# Patient Record
Sex: FEMALE | Race: BLACK OR AFRICAN AMERICAN | NOT HISPANIC OR LATINO | URBAN - METROPOLITAN AREA
[De-identification: names, ages, dates, MRNs, and addresses within clinical notes are randomized per-mention and may not be internally consistent; named-entity substitution may affect disease eponyms.]

---

## 2021-10-28 ENCOUNTER — OUTPATIENT (OUTPATIENT)
Dept: OUTPATIENT SERVICES | Facility: HOSPITAL | Age: 56
LOS: 1 days | End: 2021-10-28

## 2021-10-28 DIAGNOSIS — Z00.5 ENCOUNTER FOR EXAMINATION OF POTENTIAL DONOR OF ORGAN AND TISSUE: ICD-10-CM

## 2021-11-24 PROBLEM — Z00.00 ENCOUNTER FOR PREVENTIVE HEALTH EXAMINATION: Status: ACTIVE | Noted: 2021-11-24

## 2021-11-26 ENCOUNTER — OUTPATIENT (OUTPATIENT)
Dept: OUTPATIENT SERVICES | Facility: HOSPITAL | Age: 56
LOS: 1 days | Discharge: HOME | End: 2021-11-26

## 2021-11-26 DIAGNOSIS — Z00.5 ENCOUNTER FOR EXAMINATION OF POTENTIAL DONOR OF ORGAN AND TISSUE: ICD-10-CM

## 2021-11-29 ENCOUNTER — LABORATORY RESULT (OUTPATIENT)
Age: 56
End: 2021-11-29

## 2021-11-29 ENCOUNTER — OUTPATIENT (OUTPATIENT)
Dept: OUTPATIENT SERVICES | Facility: HOSPITAL | Age: 56
LOS: 1 days | End: 2021-11-29

## 2021-11-29 ENCOUNTER — APPOINTMENT (OUTPATIENT)
Dept: HEMATOLOGY ONCOLOGY | Facility: CLINIC | Age: 56
End: 2021-11-29
Payer: COMMERCIAL

## 2021-11-29 ENCOUNTER — RESULT REVIEW (OUTPATIENT)
Age: 56
End: 2021-11-29

## 2021-11-29 VITALS
HEART RATE: 89 BPM | BODY MASS INDEX: 32.7 KG/M2 | HEIGHT: 67.09 IN | WEIGHT: 208.31 LBS | SYSTOLIC BLOOD PRESSURE: 123 MMHG | RESPIRATION RATE: 16 BRPM | TEMPERATURE: 96.7 F | DIASTOLIC BLOOD PRESSURE: 83 MMHG | OXYGEN SATURATION: 96 %

## 2021-11-29 DIAGNOSIS — Z78.9 OTHER SPECIFIED HEALTH STATUS: ICD-10-CM

## 2021-11-29 DIAGNOSIS — Z00.5 ENCOUNTER FOR EXAMINATION OF POTENTIAL DONOR OF ORGAN AND TISSUE: ICD-10-CM

## 2021-11-29 DIAGNOSIS — Z52.001 UNSPECIFIED DONOR, STEM CELLS: ICD-10-CM

## 2021-11-29 LAB
BASOPHILS # BLD AUTO: 0.02 K/UL — SIGNIFICANT CHANGE UP (ref 0–0.2)
BASOPHILS NFR BLD AUTO: 0.4 % — SIGNIFICANT CHANGE UP (ref 0–2)
EOSINOPHIL # BLD AUTO: 0.11 K/UL — SIGNIFICANT CHANGE UP (ref 0–0.5)
EOSINOPHIL NFR BLD AUTO: 2.3 % — SIGNIFICANT CHANGE UP (ref 0–6)
HCT VFR BLD CALC: 42.7 % — SIGNIFICANT CHANGE UP (ref 34.5–45)
HGB BLD-MCNC: 13.2 G/DL — SIGNIFICANT CHANGE UP (ref 11.5–15.5)
IMM GRANULOCYTES NFR BLD AUTO: 0.2 % — SIGNIFICANT CHANGE UP (ref 0–1.5)
LYMPHOCYTES # BLD AUTO: 1.85 K/UL — SIGNIFICANT CHANGE UP (ref 1–3.3)
LYMPHOCYTES # BLD AUTO: 39.3 % — SIGNIFICANT CHANGE UP (ref 13–44)
MCHC RBC-ENTMCNC: 26.4 PG — LOW (ref 27–34)
MCHC RBC-ENTMCNC: 30.9 G/DL — LOW (ref 32–36)
MCV RBC AUTO: 85.4 FL — SIGNIFICANT CHANGE UP (ref 80–100)
MONOCYTES # BLD AUTO: 0.27 K/UL — SIGNIFICANT CHANGE UP (ref 0–0.9)
MONOCYTES NFR BLD AUTO: 5.7 % — SIGNIFICANT CHANGE UP (ref 2–14)
NEUTROPHILS # BLD AUTO: 2.45 K/UL — SIGNIFICANT CHANGE UP (ref 1.8–7.4)
NEUTROPHILS NFR BLD AUTO: 52.1 % — SIGNIFICANT CHANGE UP (ref 43–77)
NRBC # BLD: 0 /100 WBCS — SIGNIFICANT CHANGE UP (ref 0–0)
PLATELET # BLD AUTO: 320 K/UL — SIGNIFICANT CHANGE UP (ref 150–400)
RBC # BLD: 5 M/UL — SIGNIFICANT CHANGE UP (ref 3.8–5.2)
RBC # FLD: 13.2 % — SIGNIFICANT CHANGE UP (ref 10.3–14.5)
WBC # BLD: 4.71 K/UL — SIGNIFICANT CHANGE UP (ref 3.8–10.5)
WBC # FLD AUTO: 4.71 K/UL — SIGNIFICANT CHANGE UP (ref 3.8–10.5)

## 2021-11-29 PROCEDURE — 99204 OFFICE O/P NEW MOD 45 MIN: CPT

## 2021-11-29 RX ORDER — CLINDAMYCIN PHOSPHATE 10 MG/ML
1 LOTION TOPICAL
Qty: 60 | Refills: 0 | Status: ACTIVE | COMMUNITY
Start: 2020-09-15

## 2022-01-18 NOTE — PHYSICAL EXAM
[Normal] : affect appropriate [de-identified] : anicteric [de-identified] : RRR, normal S1S2 [de-identified] : no edema [de-identified] : no tattoos; no needle track marks

## 2022-01-18 NOTE — REASON FOR VISIT
[Initial Consultation] : an initial consultation for [FreeTextEntry2] : determination of suitability as a haploidentical peripheral blood stem cell donor for her brother.

## 2022-01-18 NOTE — RESULTS/DATA
[FreeTextEntry1] : WBC- 4.71, differential- 52%N, 39%L, 6%mono, 2%eos; Hgb- 13.2, Hct- 42.7; Platelets- 320,000\par PT/APTT- sample clotted, will repeat test\par CMP- within normal range; LDH- 307 U/L(hemolysis)\par HCG, serum- Negative\par Sickle Cell screen- Negative\par Urinalysis- Negative\par CMV IgG Antibody- Positive\par EBV VCA IgM- Negative; EBV VCA IgG- Positive\par Varicella IgG Antibody- Positive\par Herpes simplex(1, 2) IgM, serum- Negative\par COVID-19 Hang Domain Antibody- Positive\par \par Infectious Disease Marker Panel- CMV- Positive; remainder of panel is Negative.

## 2022-01-18 NOTE — ASSESSMENT
[FreeTextEntry1] : 56 year old female in overall good health without any active medical problems. Transfusion history- Negative. Communicable disease assessment- Negative; Zika virus infection assessment- Negative; COVID-19 Infection assessment- [place] [facility] [place] on [place]/26/20, symptoms for 6 days then resolved. She was required to stay home for 10 days and have repeat negative PCR prior to returning to work. She is planning to receive COVID-19 vaccine starting next week in [place]. Review of systems in negative. Physical exam is normal.  Lab studies within acceptable range. Infectious Disease Marker Panel- CMV- Positive; remainder of panel is Negative. \par \par Therefore, the donor is suitable and medically cleared as a haploidentical peripheral blood stem cell donor for her brother.\par \par I had a lengthy discussion with Ashlie regarding information on Consent for the Acquisition of Hematopoietic Stem Cells for Related Allogeneic Transplantation by Apheresis of Leukocyte Growth Factor Stimulated Donors which includes purpose of program, background, procedures, risks and discomforts of leukocyte growth factor, risks associated with the Apheresis(Collection)procedure, alternative choices, confidentiality. She had adequate time to ask all questions that she wished and these were answered to her satisfaction prior to completion of consultation visit. \par \par

## 2022-01-18 NOTE — HISTORY OF PRESENT ILLNESS
[de-identified] : 56 year old female in overall good health without any active medical problems. She denies requiring medical evaluation in an emergency room, hospitalization, or surgery in the past 12 months. \par Transfusion history- Negative\par Vaccination history- none in the past 4 weeks; she is now planning to receive the COVID-19 vaccine starting next week in [place] where she resides. \par Travel history- she has not traveled outside the [place] or [place] in the past 12 months; she has not lived outside the [place] or [place] in the past 3 years. \par Communicable disease assessment- Negative; Zika virus infection assessment- Negative; COVID-19 Infection assessment- [place] [facility] [place] with nasopharyngeal swab on 12/26/20, symptoms for 6 days then resolved. She was required to stay home for 10 days and have repeat negative PCR prior to returning to work. She is planning to receive COVID-19 vaccine starting next week in [place]. \par \par Past Medical History- Negative\par Past Surgical History- Negative\par \par Gyn- \par She has Gyn evaluation scheduled for December 2021 and mammogram in Jan 2022; no abnormalities on prior mammograms. She will have breast exam at time of Gyn evaluation.

## 2022-01-18 NOTE — REVIEW OF SYSTEMS
[Fever] : no fever [Chills] : no chills [Night Sweats] : no night sweats [Fatigue] : no fatigue [Nosebleeds] : no nosebleeds [Mucosal Pain] : no mucosal pain [Chest Pain] : no chest pain [Lower Ext Edema] : no lower extremity edema [Shortness Of Breath] : no shortness of breath [Cough] : no cough [Abdominal Pain] : no abdominal pain [Vomiting] : no vomiting [Diarrhea] : no diarrhea [Dysuria] : no dysuria [Skin Rash] : no skin rash [Dizziness] : no dizziness [Fainting] : no fainting [Anxiety] : no anxiety [Depression] : no depression [Easy Bleeding] : no tendency for easy bleeding [Easy Bruising] : no tendency for easy bruising [Swollen Glands] : no swollen glands [FreeTextEntry7] : no nausea [FreeTextEntry9] : no bone pain